# Patient Record
Sex: FEMALE | Race: WHITE | NOT HISPANIC OR LATINO
[De-identification: names, ages, dates, MRNs, and addresses within clinical notes are randomized per-mention and may not be internally consistent; named-entity substitution may affect disease eponyms.]

---

## 2017-01-09 ENCOUNTER — APPOINTMENT (OUTPATIENT)
Dept: OTOLARYNGOLOGY | Facility: CLINIC | Age: 47
End: 2017-01-09

## 2017-01-09 VITALS
TEMPERATURE: 98.6 F | HEIGHT: 69 IN | HEART RATE: 71 BPM | BODY MASS INDEX: 19.99 KG/M2 | DIASTOLIC BLOOD PRESSURE: 82 MMHG | SYSTOLIC BLOOD PRESSURE: 124 MMHG | WEIGHT: 135 LBS

## 2017-01-09 DIAGNOSIS — Z80.0 FAMILY HISTORY OF MALIGNANT NEOPLASM OF DIGESTIVE ORGANS: ICD-10-CM

## 2017-01-09 DIAGNOSIS — Z80.2 FAMILY HISTORY OF MALIGNANT NEOPLASM OF OTHER RESPIRATORY AND INTRATHORACIC ORGANS: ICD-10-CM

## 2017-02-06 ENCOUNTER — APPOINTMENT (OUTPATIENT)
Dept: OTOLARYNGOLOGY | Facility: CLINIC | Age: 47
End: 2017-02-06

## 2017-02-06 VITALS
DIASTOLIC BLOOD PRESSURE: 74 MMHG | HEART RATE: 82 BPM | WEIGHT: 135 LBS | BODY MASS INDEX: 19.99 KG/M2 | SYSTOLIC BLOOD PRESSURE: 108 MMHG | HEIGHT: 69 IN

## 2017-02-14 ENCOUNTER — OTHER (OUTPATIENT)
Age: 47
End: 2017-02-14

## 2017-02-16 ENCOUNTER — FORM ENCOUNTER (OUTPATIENT)
Age: 47
End: 2017-02-16

## 2017-02-17 ENCOUNTER — OUTPATIENT (OUTPATIENT)
Dept: OUTPATIENT SERVICES | Facility: HOSPITAL | Age: 47
LOS: 1 days | End: 2017-02-17
Payer: COMMERCIAL

## 2017-02-17 ENCOUNTER — APPOINTMENT (OUTPATIENT)
Dept: PULMONOLOGY | Facility: CLINIC | Age: 47
End: 2017-02-17

## 2017-02-17 DIAGNOSIS — G51.1 GENICULATE GANGLIONITIS: ICD-10-CM

## 2017-02-17 DIAGNOSIS — D16.4 BENIGN NEOPLASM OF BONES OF SKULL AND FACE: ICD-10-CM

## 2017-02-17 PROCEDURE — 71046 X-RAY EXAM CHEST 2 VIEWS: CPT

## 2017-02-17 PROCEDURE — 71020: CPT | Mod: 26

## 2017-02-24 ENCOUNTER — APPOINTMENT (OUTPATIENT)
Dept: OTOLARYNGOLOGY | Facility: CLINIC | Age: 47
End: 2017-02-24

## 2017-02-27 VITALS
TEMPERATURE: 98 F | HEART RATE: 97 BPM | OXYGEN SATURATION: 96 % | RESPIRATION RATE: 16 BRPM | DIASTOLIC BLOOD PRESSURE: 71 MMHG | WEIGHT: 133.16 LBS | SYSTOLIC BLOOD PRESSURE: 112 MMHG | HEIGHT: 69 IN

## 2017-02-27 NOTE — ASU PATIENT PROFILE, ADULT - PMH
Acoustic neuroma    Facial paralysis on left side    Hematoma  in abdomen s/p acoustic neuroma surgery  Hydrocephalus

## 2017-02-27 NOTE — ASU PATIENT PROFILE, ADULT - PSH
History of surgery  shunt for hydrocephalitis  History of surgery  removal of acoustic neuroma  History of surgery  implant of sling to left eye  History of surgery  nerve regeneration

## 2017-03-01 ENCOUNTER — INPATIENT (INPATIENT)
Facility: HOSPITAL | Age: 47
LOS: 1 days | Discharge: HOME | DRG: 41 | End: 2017-03-03
Attending: SPECIALIST | Admitting: SPECIALIST
Payer: COMMERCIAL

## 2017-03-01 ENCOUNTER — APPOINTMENT (OUTPATIENT)
Dept: OTOLARYNGOLOGY | Facility: HOSPITAL | Age: 47
End: 2017-03-01

## 2017-03-01 DIAGNOSIS — Z98.890 OTHER SPECIFIED POSTPROCEDURAL STATES: Chronic | ICD-10-CM

## 2017-03-01 DIAGNOSIS — G51.0 BELL'S PALSY: ICD-10-CM

## 2017-03-01 LAB — HCG SERPL-ACNC: <1 MIU/ML — SIGNIFICANT CHANGE UP

## 2017-03-01 PROCEDURE — 15275 SKIN SUB GRAFT FACE/NK/HF/G: CPT

## 2017-03-01 PROCEDURE — 15840 NERVE PALSY FASCIAL GRAFT: CPT | Mod: LT

## 2017-03-01 PROCEDURE — 30465 REPAIR NASAL STENOSIS: CPT

## 2017-03-01 RX ORDER — DEXAMETHASONE 0.5 MG/5ML
8 ELIXIR ORAL EVERY 8 HOURS
Qty: 0 | Refills: 0 | Status: DISCONTINUED | OUTPATIENT
Start: 2017-03-01 | End: 2017-03-03

## 2017-03-01 RX ORDER — ACETAMINOPHEN 500 MG
650 TABLET ORAL EVERY 6 HOURS
Qty: 0 | Refills: 0 | Status: DISCONTINUED | OUTPATIENT
Start: 2017-03-01 | End: 2017-03-03

## 2017-03-01 RX ORDER — PANTOPRAZOLE SODIUM 20 MG/1
40 TABLET, DELAYED RELEASE ORAL
Qty: 0 | Refills: 0 | Status: DISCONTINUED | OUTPATIENT
Start: 2017-03-01 | End: 2017-03-03

## 2017-03-01 RX ORDER — FOLIC ACID 0.8 MG
1 TABLET ORAL DAILY
Qty: 0 | Refills: 0 | Status: DISCONTINUED | OUTPATIENT
Start: 2017-03-01 | End: 2017-03-03

## 2017-03-01 RX ORDER — OXYCODONE HYDROCHLORIDE 5 MG/1
5 TABLET ORAL EVERY 4 HOURS
Qty: 0 | Refills: 0 | Status: DISCONTINUED | OUTPATIENT
Start: 2017-03-01 | End: 2017-03-03

## 2017-03-01 RX ORDER — ZOLPIDEM TARTRATE 10 MG/1
5 TABLET ORAL AT BEDTIME
Qty: 0 | Refills: 0 | Status: DISCONTINUED | OUTPATIENT
Start: 2017-03-01 | End: 2017-03-03

## 2017-03-01 RX ORDER — SODIUM CHLORIDE 9 MG/ML
1000 INJECTION, SOLUTION INTRAVENOUS
Qty: 0 | Refills: 0 | Status: DISCONTINUED | OUTPATIENT
Start: 2017-03-01 | End: 2017-03-02

## 2017-03-01 RX ORDER — MORPHINE SULFATE 50 MG/1
4 CAPSULE, EXTENDED RELEASE ORAL
Qty: 0 | Refills: 0 | Status: DISCONTINUED | OUTPATIENT
Start: 2017-03-01 | End: 2017-03-02

## 2017-03-01 RX ORDER — DIAZEPAM 5 MG
5 TABLET ORAL EVERY 8 HOURS
Qty: 0 | Refills: 0 | Status: DISCONTINUED | OUTPATIENT
Start: 2017-03-01 | End: 2017-03-03

## 2017-03-01 RX ORDER — CEFAZOLIN SODIUM 1 G
1000 VIAL (EA) INJECTION EVERY 8 HOURS
Qty: 0 | Refills: 0 | Status: DISCONTINUED | OUTPATIENT
Start: 2017-03-01 | End: 2017-03-03

## 2017-03-01 RX ORDER — MORPHINE SULFATE 50 MG/1
4 CAPSULE, EXTENDED RELEASE ORAL EVERY 6 HOURS
Qty: 0 | Refills: 0 | Status: DISCONTINUED | OUTPATIENT
Start: 2017-03-01 | End: 2017-03-02

## 2017-03-01 RX ORDER — THIAMINE MONONITRATE (VIT B1) 100 MG
100 TABLET ORAL DAILY
Qty: 0 | Refills: 0 | Status: DISCONTINUED | OUTPATIENT
Start: 2017-03-01 | End: 2017-03-03

## 2017-03-01 RX ORDER — PETROLATUM,WHITE
1 JELLY (GRAM) TOPICAL
Qty: 0 | Refills: 0 | Status: DISCONTINUED | OUTPATIENT
Start: 2017-03-01 | End: 2017-03-03

## 2017-03-01 RX ADMIN — MORPHINE SULFATE 4 MILLIGRAM(S): 50 CAPSULE, EXTENDED RELEASE ORAL at 14:43

## 2017-03-01 RX ADMIN — Medication 1 MILLIGRAM(S): at 22:10

## 2017-03-01 RX ADMIN — Medication 5 MILLIGRAM(S): at 22:14

## 2017-03-01 RX ADMIN — MORPHINE SULFATE 4 MILLIGRAM(S): 50 CAPSULE, EXTENDED RELEASE ORAL at 14:27

## 2017-03-01 RX ADMIN — Medication 101.6 MILLIGRAM(S): at 18:22

## 2017-03-01 RX ADMIN — SODIUM CHLORIDE 80 MILLILITER(S): 9 INJECTION, SOLUTION INTRAVENOUS at 18:23

## 2017-03-01 RX ADMIN — Medication 1 MILLIGRAM(S): at 17:55

## 2017-03-01 RX ADMIN — Medication 100 MILLIGRAM(S): at 18:14

## 2017-03-01 NOTE — H&P ADULT - PROBLEM SELECTOR PLAN 1
- regular room  - ancef  - face bra at all times  - decadron  - ambulate  - HOB 30 degrees  - replace fluffs as needed  - home ambien at bedtime PRN

## 2017-03-01 NOTE — H&P ADULT - HISTORY OF PRESENT ILLNESS
46F PMH AN and subsequent facial paralysis and left eyelid spring, cross face cable nerve graft and masseter transposition with nasal collapse, oral incompetence and continued facial paralysis admitted for surgical repair

## 2017-03-01 NOTE — H&P ADULT - ASSESSMENT
46F PMH AN and subsequent facial paralysis and left eyelid spring, cross face cable nerve graft and masseter transposition with nasal collapse, oral incompetence and continued facial paralysis admitted now s/p lip augmentation, suspension and lateralization of nasal ala

## 2017-03-01 NOTE — PROGRESS NOTE ADULT - SUBJECTIVE AND OBJECTIVE BOX
ENT [facility] POST OP CHECK    Procedure    No acute events post op. Pain controlled. Tolerating CLD. In face bra with multiple fluffs. Ambulating. Denies nausea/vomiting.       MEDICATIONS:  Antiinfectives:   ceFAZolin   IVPB 1000milliGRAM(s) IV Intermittent every 8 hours    IV fluids:  lactated ringers. 1000milliLiter(s) IV Continuous <Continuous>  thiamine 100milliGRAM(s) Oral daily  folic acid 1milliGRAM(s) Oral daily    Hematologic/Anticoagulation:    Pain medications/Neuro:  zolpidem 5milliGRAM(s) Oral at bedtime PRN  morphine  - Injectable 4milliGRAM(s) IV Push every 15 minutes PRN  oxyCODONE  5 mG/acetaminophen 325 mG 1Tablet(s) Oral every 4 hours PRN  oxyCODONE  5 mG/acetaminophen 325 mG 2Tablet(s) Oral every 4 hours PRN  morphine  - Injectable 4milliGRAM(s) IV Push every 6 hours PRN  acetaminophen   Tablet. 650milliGRAM(s) Oral every 6 hours PRN  diazepam    Tablet 5milliGRAM(s) Oral every 8 hours PRN    Endocrine Medications:   dexamethasone  IVPB 8milliGRAM(s) IV Intermittent every 8 hours    All other standing medications:   pantoprazole    Tablet 40milliGRAM(s) Oral before breakfast    All other PRN medications:      Vital Signs Last 24 Hrs  T(C): 36.6, Max: 36.6 (03-01 @ 13:40)  T(F): 97.8, Max: 97.8 (03-01 @ 13:40)  HR: 68 (68 - 104)  BP: 98/57 (84/54 - 114/66)  BP(mean): --  RR: 16 (16 - 16)  SpO2: 95% (95% - 100%)      I & Os for current day (as of 03-01 @ 18:14)  =============================================  IN:    lactated ringers.: 80 ml    Total IN: 80 ml  ---------------------------------------------  OUT:    Total OUT: 0 ml  ---------------------------------------------  Total NET: 80 ml        PHYSICAL EXAM:    ENT EXAM-   Constitutional: Well-developed, well-nourished.    OC/OP: Floor of mouth, buccal mucosa, lips, hard palate, soft palate, uvula, posterior pharyngeal wall normal.  Mucosa moist.  Neck:  Incision C/D/I, soft. penrose draining minimal sanguinous fluid. Minimally saturated dressing. appropriate TTP.   Neuro/Psych:  A&O x 3.  Mood stable.  feels shaky.  Pulm:  No dyspnea, non-labored breathing on room air      LABS:  CBC-      Coagulation Studies-    Endocrine Panel-              RADIOLOGY & ADDITIONAL STUDIES:      Assessment/Plan:  46y Female s/p lip augmentation, ala lateralization and suspension of lower face  - pain control  - anti-emetics prn  - soft diet  - ancef  - decadron  - change dressing as needed  - face bra at all times  - monitor for signs of withdrawl, Valium PRN if required  - home ambien at bedtime  - ambulate  - discussed case with Dr. Bowser who also examined the patient and agrees with the plan    PPX: SCDs, I/S    Dispo planning: likely no home needs

## 2017-03-01 NOTE — H&P ADULT - NSHPPHYSICALEXAM_GEN_ALL_CORE
NAD  non-labored breathing, no stridor  L facial paralysis, oral incompetence, left lip atrophy   EOMI, PERRL

## 2017-03-02 PROBLEM — G91.9 HYDROCEPHALUS, UNSPECIFIED: Chronic | Status: ACTIVE | Noted: 2017-02-27

## 2017-03-02 PROBLEM — G51.0 BELL'S PALSY: Chronic | Status: ACTIVE | Noted: 2017-02-27

## 2017-03-02 PROBLEM — T14.8 OTHER INJURY OF UNSPECIFIED BODY REGION: Chronic | Status: ACTIVE | Noted: 2017-02-27

## 2017-03-02 PROBLEM — D33.3 BENIGN NEOPLASM OF CRANIAL NERVES: Chronic | Status: ACTIVE | Noted: 2017-02-27

## 2017-03-02 RX ORDER — BENZOCAINE AND MENTHOL 5; 1 G/100ML; G/100ML
1 LIQUID ORAL EVERY 6 HOURS
Qty: 0 | Refills: 0 | Status: DISCONTINUED | OUTPATIENT
Start: 2017-03-02 | End: 2017-03-03

## 2017-03-02 RX ADMIN — OXYCODONE HYDROCHLORIDE 5 MILLIGRAM(S): 5 TABLET ORAL at 09:18

## 2017-03-02 RX ADMIN — Medication 101.6 MILLIGRAM(S): at 09:39

## 2017-03-02 RX ADMIN — OXYCODONE HYDROCHLORIDE 5 MILLIGRAM(S): 5 TABLET ORAL at 01:15

## 2017-03-02 RX ADMIN — Medication 1 MILLIGRAM(S): at 11:27

## 2017-03-02 RX ADMIN — OXYCODONE HYDROCHLORIDE 5 MILLIGRAM(S): 5 TABLET ORAL at 19:45

## 2017-03-02 RX ADMIN — Medication 100 MILLIGRAM(S): at 11:27

## 2017-03-02 RX ADMIN — ZOLPIDEM TARTRATE 5 MILLIGRAM(S): 10 TABLET ORAL at 03:02

## 2017-03-02 RX ADMIN — Medication 100 MILLIGRAM(S): at 17:35

## 2017-03-02 RX ADMIN — Medication 100 MILLIGRAM(S): at 09:39

## 2017-03-02 RX ADMIN — Medication 100 MILLIGRAM(S): at 04:14

## 2017-03-02 RX ADMIN — OXYCODONE HYDROCHLORIDE 5 MILLIGRAM(S): 5 TABLET ORAL at 00:32

## 2017-03-02 RX ADMIN — OXYCODONE HYDROCHLORIDE 5 MILLIGRAM(S): 5 TABLET ORAL at 19:18

## 2017-03-02 RX ADMIN — OXYCODONE HYDROCHLORIDE 5 MILLIGRAM(S): 5 TABLET ORAL at 15:02

## 2017-03-02 RX ADMIN — OXYCODONE HYDROCHLORIDE 5 MILLIGRAM(S): 5 TABLET ORAL at 09:48

## 2017-03-02 RX ADMIN — PANTOPRAZOLE SODIUM 40 MILLIGRAM(S): 20 TABLET, DELAYED RELEASE ORAL at 08:39

## 2017-03-02 RX ADMIN — Medication 101.6 MILLIGRAM(S): at 03:03

## 2017-03-02 RX ADMIN — OXYCODONE HYDROCHLORIDE 5 MILLIGRAM(S): 5 TABLET ORAL at 23:27

## 2017-03-02 RX ADMIN — Medication 101.6 MILLIGRAM(S): at 17:35

## 2017-03-02 RX ADMIN — BENZOCAINE AND MENTHOL 1 LOZENGE: 5; 1 LIQUID ORAL at 18:16

## 2017-03-02 RX ADMIN — OXYCODONE HYDROCHLORIDE 5 MILLIGRAM(S): 5 TABLET ORAL at 15:32

## 2017-03-02 NOTE — PROGRESS NOTE ADULT - SUBJECTIVE AND OBJECTIVE BOX
ENT [facility] DAILY PROGRESS NOTE    Overnight events/Interval HPI: 46y Female s/p lip augmentation, L nasal ala lateralization, lower lip suspension on 3/1/17. POD 1 c/o increased facial swelling overnight, facial dressing intact, pain controlled, tolerating diet, denies n/v/f/c.      Allergies  No Known Allergies      MEDICATIONS:  Antiinfectives:   ceFAZolin   IVPB 1000milliGRAM(s) IV Intermittent every 8 hours    IV fluids:  lactated ringers. 1000milliLiter(s) IV Continuous <Continuous>  thiamine 100milliGRAM(s) Oral daily  folic acid 1milliGRAM(s) Oral daily    Hematologic/Anticoagulation:    Pain medications/Neuro:  zolpidem 5milliGRAM(s) Oral at bedtime PRN  morphine  - Injectable 4milliGRAM(s) IV Push every 15 minutes PRN  morphine  - Injectable 4milliGRAM(s) IV Push every 6 hours PRN  acetaminophen   Tablet. 650milliGRAM(s) Oral every 6 hours PRN  diazepam    Tablet 5milliGRAM(s) Oral every 8 hours PRN  oxyCODONE Solution 5milliGRAM(s) Oral every 4 hours PRN    Endocrine Medications:   dexamethasone  IVPB 8milliGRAM(s) IV Intermittent every 8 hours    All other standing medications:   pantoprazole    Tablet 40milliGRAM(s) Oral before breakfast    All other PRN medications:  AQUAPHOR (petrolatum Ointment) 1Application(s) Topical two times a day PRN      Vital Signs Last 24 Hrs  T(C): 37, Max: 37 (03-02 @ 05:22)  T(F): 98.6, Max: 98.6 (03-02 @ 05:22)  HR: 100 (68 - 104)  BP: 100/65 (84/54 - 114/66)  BP(mean): --  RR: 18 (16 - 18)  SpO2: 95% (95% - 100%)      I & Os for current day (as of 03-02 @ 07:54)  =============================================  IN:    lactated ringers.: 1040 ml    Solution: 50 ml    Solution: 50 ml    Total IN: 1140 ml  ---------------------------------------------  OUT:    Voided: 2000 ml    Total OUT: 2000 ml  ---------------------------------------------  Total NET: -860 ml        PHYSICAL EXAM:    ENT EXAM-   Constitutional: Well-developed, well-nourished.    OC/OP: Floor of mouth, buccal mucosa, lips, hard palate, soft palate, uvula, posterior pharyngeal wall normal.  Mucosa moist.  Neck:  Incision C/D/I, soft. penrose draining minimal sanguinous fluid. Minimally saturated dressing. appropriate TTP.   Neuro/Psych:  A&O x 3.  Mood stable.  feels shaky.  Pulm:  No dyspnea, non-labored breathing on room air      Assessment/Plan:  46y Female s/p lip augmentation, ala lateralization and suspension of lower face  - pain control  - anti-emetics prn  - soft diet  - ancef  - decadron  - change dressing as needed  - face bra at all times  - monitor for signs of withdrawl, valium PRN if required  - home ambien at bedtime  - ambulate  - discussed case with Dr. Bowser who also examined the patient and agrees with the plan    PPX: SCDs, I/S    Dispo planning: no home care needs

## 2017-03-03 ENCOUNTER — TRANSCRIPTION ENCOUNTER (OUTPATIENT)
Age: 47
End: 2017-03-03

## 2017-03-03 VITALS
OXYGEN SATURATION: 97 % | SYSTOLIC BLOOD PRESSURE: 121 MMHG | TEMPERATURE: 98 F | DIASTOLIC BLOOD PRESSURE: 81 MMHG | RESPIRATION RATE: 17 BRPM | HEART RATE: 69 BPM

## 2017-03-03 PROCEDURE — 84702 CHORIONIC GONADOTROPIN TEST: CPT

## 2017-03-03 PROCEDURE — C1713: CPT

## 2017-03-03 RX ORDER — DOCUSATE SODIUM 100 MG
1 CAPSULE ORAL
Qty: 20 | Refills: 0 | OUTPATIENT
Start: 2017-03-03 | End: 2017-03-13

## 2017-03-03 RX ORDER — ACETAMINOPHEN 500 MG
2 TABLET ORAL
Qty: 0 | Refills: 0 | COMMUNITY
Start: 2017-03-03

## 2017-03-03 RX ORDER — LANOLIN/MINERAL OIL
1 LOTION (ML) TOPICAL
Qty: 1 | Refills: 0 | OUTPATIENT
Start: 2017-03-03

## 2017-03-03 RX ORDER — BENZOCAINE AND MENTHOL 5; 1 G/100ML; G/100ML
1 LIQUID ORAL
Qty: 1 | Refills: 0 | OUTPATIENT
Start: 2017-03-03

## 2017-03-03 RX ORDER — OXYCODONE HYDROCHLORIDE 5 MG/1
1 TABLET ORAL
Qty: 35 | Refills: 0 | OUTPATIENT
Start: 2017-03-03 | End: 2017-03-10

## 2017-03-03 RX ORDER — CEPHALEXIN 500 MG
1 CAPSULE ORAL
Qty: 28 | Refills: 0 | OUTPATIENT
Start: 2017-03-03 | End: 2017-03-10

## 2017-03-03 RX ORDER — SENNA PLUS 8.6 MG/1
2 TABLET ORAL
Qty: 20 | Refills: 0 | OUTPATIENT
Start: 2017-03-03 | End: 2017-03-13

## 2017-03-03 RX ORDER — FOLIC ACID 0.8 MG
1 TABLET ORAL
Qty: 10 | Refills: 0 | OUTPATIENT
Start: 2017-03-03 | End: 2017-03-13

## 2017-03-03 RX ORDER — THIAMINE MONONITRATE (VIT B1) 100 MG
1 TABLET ORAL
Qty: 10 | Refills: 0 | OUTPATIENT
Start: 2017-03-03 | End: 2017-03-13

## 2017-03-03 RX ADMIN — OXYCODONE HYDROCHLORIDE 5 MILLIGRAM(S): 5 TABLET ORAL at 00:20

## 2017-03-03 RX ADMIN — Medication 101.6 MILLIGRAM(S): at 11:07

## 2017-03-03 RX ADMIN — Medication 100 MILLIGRAM(S): at 11:08

## 2017-03-03 RX ADMIN — Medication 101.6 MILLIGRAM(S): at 01:34

## 2017-03-03 RX ADMIN — Medication 100 MILLIGRAM(S): at 01:34

## 2017-03-03 RX ADMIN — ZOLPIDEM TARTRATE 5 MILLIGRAM(S): 10 TABLET ORAL at 01:33

## 2017-03-03 RX ADMIN — OXYCODONE HYDROCHLORIDE 5 MILLIGRAM(S): 5 TABLET ORAL at 11:08

## 2017-03-03 RX ADMIN — PANTOPRAZOLE SODIUM 40 MILLIGRAM(S): 20 TABLET, DELAYED RELEASE ORAL at 05:46

## 2017-03-03 RX ADMIN — OXYCODONE HYDROCHLORIDE 5 MILLIGRAM(S): 5 TABLET ORAL at 05:46

## 2017-03-03 RX ADMIN — Medication 1 MILLIGRAM(S): at 11:08

## 2017-03-03 RX ADMIN — OXYCODONE HYDROCHLORIDE 5 MILLIGRAM(S): 5 TABLET ORAL at 12:54

## 2017-03-03 NOTE — DISCHARGE NOTE ADULT - INSTRUCTIONS
1. Report any fever, chills, difficulty breathing, difficulty swallowing, bleeding or purulent drainage from your incision sites, or any significant swelling to your face/neck to the doctor immediately.  2. Report any change in the movements of your face to the doctor immediately.    3. Diet: soft/mechanical soft diet, no sharp foods, no extensive chewing of steaks or hard meats ect. You can resume a normal diet once instructed by your doctor  4. Activity: no heavy lifting, do not lift anything heavier than a gallon of milk until after you follow up appointment with your doctor, no strenuous activity such as running or biking.  5. Shower/Bathing: you may shower starting 48 hrs after your procedure, but do not scrub at any of your incision sites.  6. Wound care: keep your incision site clean and dry, if you have any drainage or crusting on your incisions you may gently clean them with 1/2 sterile saline 1/2 hydrogen peroxide, do NOT scrub or pick off any crusting it should be gently cleaned and will come off on its own once softened by 1/2 saline 1/2 peroxide solution.   7. Take all medications as prescribed.   8. Continue all of your normal home medications unless told otherwise.  9. Avoid any NSAIDS or ibuprofen/asa containing products you may take Tylenol for mild pain.  10. Keep face bra in place and wear 24 hrs a day except for when showering until you see Dr. Bowser in clinic on Monday, he will tell you if you should continue wearing it longer on Monday. Face bra can be washed in warm soapy water and air dried if you need to wash it for any reason.

## 2017-03-03 NOTE — DISCHARGE NOTE ADULT - HOSPITAL COURSE
46y Female s/p lip augmentation, L nasal ala lateralization, lower lip suspension on 3/1/17.   POD 1: c/o increased facial swelling overnight, facial dressing intact, pain controlled, tolerating diet, denies n/v/f/c. kept overnight for pain control  POD 2:  pain controlled on oral regimen, stable for d/c home      Discharge exam:   ENT EXAM-   Constitutional: Well-developed, well-nourished.    OC/OP: Floor of mouth, buccal mucosa, lips, hard palate, soft palate, uvula, posterior pharyngeal wall normal.  Mucosa moist.  Face/Neck:  Incision C/D/I, soft. ecchymotic swelling improved from yesterday, face bra in place. appropriate TTP.   Neuro/Psych:  A&O x 3.  Mood stable.   Pulm:  No dyspnea, non-labored breathing on room air    Vital Signs Last 24 Hrs  T(C): 36.6, Max: 37.3 (03-03 @ 05:53)  T(F): 97.8, Max: 99.2 (03-03 @ 05:53)  HR: 69 (66 - 86)  BP: 121/81 (99/65 - 121/81)  BP(mean): --  RR: 17 (16 - 17)  SpO2: 97% (96% - 98%)

## 2017-03-03 NOTE — DISCHARGE NOTE ADULT - PATIENT PORTAL LINK FT
“You can access the [employer] Patient Portal, offered by [facility], by registering with the following website: [email or web address]”

## 2017-03-03 NOTE — DISCHARGE NOTE ADULT - MEDICATION SUMMARY - MEDICATIONS TO TAKE
I will START or STAY ON the medications listed below when I get home from the hospital:    Medrol Dosepak 4 mg oral tablet  -- Take medrol dose pack as instructed on package.  -- It is very important that you take or use this exactly as directed.  Do not skip doses or discontinue unless directed by your doctor.  Obtain medical advice before taking any non-prescription drugs as some may affect the action of this medication.  Take with food or milk.    -- Indication: For To help with post op swelling    Percocet 5/325 oral tablet  -- 1 tab(s) by mouth every 4 hours, As Needed -for severe pain MDD:6 tabs. Do not take at the same time as regular Tyenol.  -- Caution federal law prohibits the transfer of this drug to any person other  than the person for whom it was prescribed.  May cause drowsiness.  Alcohol may intensify this effect.  Use care when operating dangerous machinery.  This prescription cannot be refilled.  This product contains acetaminophen.  Do not use  with any other product containing acetaminophen to prevent possible liver damage.  Using more of this medication than prescribed may cause serious breathing problems.    -- Indication: For As needed for post op pain    acetaminophen 325 mg oral tablet  -- 2 tab(s) by mouth every 6 hours, As needed, Mild Pain, fever>100.4, headache  -- Indication: For As needed for mild pain     cephalexin 500 mg oral capsule  -- 1 cap(s) by mouth 4 times a day x 7 days  -- Finish all this medication unless otherwise directed by prescriber.    -- Indication: For To prevent post op infection    Aquaphor topical ointment  -- Apply on skin to lips 2 times a day PRN dry lips  -- For external use only.    -- Indication: For For dry lips    benzocaine-menthol 15 mg-3.6 mg mucous membrane lozenge  -- 1 lozenge mucous membrane 4 times a day, As Needed for sore throat  -- Indication: For As needed for sore throat    folic acid 1 mg oral tablet  -- 1 tab(s) by mouth once a day  -- Indication: For Vitamins to take post op     thiamine 100 mg oral tablet  -- 1 tab(s) by mouth once a day  -- Indication: For Vitamins to take post op I will START or STAY ON the medications listed below when I get home from the hospital:    Medrol Dosepak 4 mg oral tablet  -- Take medrol dose pack as instructed on package.  -- It is very important that you take or use this exactly as directed.  Do not skip doses or discontinue unless directed by your doctor.  Obtain medical advice before taking any non-prescription drugs as some may affect the action of this medication.  Take with food or milk.    -- Indication: For To help with post op swelling    Percocet 5/325 oral tablet  -- 1 tab(s) by mouth every 4 hours, As Needed -for severe pain MDD:6 tabs. Do not take at the same time as regular Tyenol.  -- Caution federal law prohibits the transfer of this drug to any person other  than the person for whom it was prescribed.  May cause drowsiness.  Alcohol may intensify this effect.  Use care when operating dangerous machinery.  This prescription cannot be refilled.  This product contains acetaminophen.  Do not use  with any other product containing acetaminophen to prevent possible liver damage.  Using more of this medication than prescribed may cause serious breathing problems.    -- Indication: For As needed for post op pain    acetaminophen 325 mg oral tablet  -- 2 tab(s) by mouth every 6 hours, As needed, Mild Pain, fever>100.4, headache  -- Indication: For As needed for mild pain     cephalexin 500 mg oral capsule  -- 1 cap(s) by mouth 4 times a day x 7 days  -- Finish all this medication unless otherwise directed by prescriber.    -- Indication: For To prevent post op infection    Aquaphor topical ointment  -- Apply on skin to lips 2 times a day PRN dry lips  -- For external use only.    -- Indication: For For dry lips    senna 8.6 mg oral tablet  -- 2 tab(s) by mouth once a day (at bedtime), As Needed -for constipation  -- Indication: For As needed for constipation    Colace sodium 100 mg oral capsule  -- 1 cap(s) by mouth 2 times a day, As Needed -for constipation  -- Medication should be taken with plenty of water.    -- Indication: For As needed for constipation    benzocaine-menthol 15 mg-3.6 mg mucous membrane lozenge  -- 1 lozenge mucous membrane 4 times a day, As Needed for sore throat  -- Indication: For As needed for sore throat    folic acid 1 mg oral tablet  -- 1 tab(s) by mouth once a day  -- Indication: For Vitamins to take post op     thiamine 100 mg oral tablet  -- 1 tab(s) by mouth once a day  -- Indication: For Vitamins to take post op I will START or STAY ON the medications listed below when I get home from the hospital:    Medrol Dosepak 4 mg oral tablet  -- Take medrol dose pack as instructed on package.  -- It is very important that you take or use this exactly as directed.  Do not skip doses or discontinue unless directed by your doctor.  Obtain medical advice before taking any non-prescription drugs as some may affect the action of this medication.  Take with food or milk.    -- Indication: For To help with post op swelling    oxyCODONE 5 mg oral tablet  -- 1 tab(s) by mouth every 4-6 hours MDD:5-6 tabs PRN severe pain  -- Caution federal law prohibits the transfer of this drug to any person other  than the person for whom it was prescribed.  It is very important that you take or use this exactly as directed.  Do not skip doses or discontinue unless directed by your doctor.  May cause drowsiness.  Alcohol may intensify this effect.  Use care when operating dangerous machinery.  This prescription cannot be refilled.  Using more of this medication than prescribed may cause serious breathing problems.    -- Indication: For As needed for severe pain    acetaminophen 325 mg oral tablet  -- 2 tab(s) by mouth every 6 hours, As needed, Mild Pain, fever>100.4, headache  -- Indication: For As needed for mild pain     cephalexin 500 mg oral capsule  -- 1 cap(s) by mouth 4 times a day x 7 days  -- Finish all this medication unless otherwise directed by prescriber.    -- Indication: For To prevent post op infection    Aquaphor topical ointment  -- Apply on skin to lips 2 times a day PRN dry lips  -- For external use only.    -- Indication: For For dry lips    senna 8.6 mg oral tablet  -- 2 tab(s) by mouth once a day (at bedtime), As Needed -for constipation  -- Indication: For As needed for constipation    Colace sodium 100 mg oral capsule  -- 1 cap(s) by mouth 2 times a day, As Needed -for constipation  -- Medication should be taken with plenty of water.    -- Indication: For As needed for constipation    benzocaine-menthol 15 mg-3.6 mg mucous membrane lozenge  -- 1 lozenge mucous membrane 4 times a day, As Needed for sore throat  -- Indication: For As needed for sore throat    folic acid 1 mg oral tablet  -- 1 tab(s) by mouth once a day  -- Indication: For Vitamins to take post op     thiamine 100 mg oral tablet  -- 1 tab(s) by mouth once a day  -- Indication: For Vitamins to take post op

## 2017-03-03 NOTE — DISCHARGE NOTE ADULT - CARE PROVIDERS DIRECT ADDRESSES
[email or web address]

## 2017-03-03 NOTE — DISCHARGE NOTE ADULT - CARE PROVIDER_API CALL
Edward Bowser), Otolaryngology  [address] [place] [address]  Phone: [phone]  Fax: [phone]

## 2017-03-03 NOTE — DISCHARGE NOTE ADULT - CARE PLAN
Principal Discharge DX:	Facial paralysis on left side  Goal:	s/p lip augmentation, L nasal ala lateralization, lower lip suspension  Instructions for follow-up, activity and diet:	Follow up with Dr. Bowser on Monday 3/6/17. Call his office to make your follow up appointment.

## 2017-03-03 NOTE — DISCHARGE NOTE ADULT - PLAN OF CARE
s/p lip augmentation, L nasal ala lateralization, lower lip suspension Follow up with Dr. Bowser on Monday 3/6/17. Call his office to make your follow up appointment.

## 2017-03-06 ENCOUNTER — APPOINTMENT (OUTPATIENT)
Dept: OTOLARYNGOLOGY | Facility: CLINIC | Age: 47
End: 2017-03-06

## 2017-03-06 VITALS
HEART RATE: 98 BPM | HEIGHT: 69 IN | DIASTOLIC BLOOD PRESSURE: 71 MMHG | SYSTOLIC BLOOD PRESSURE: 101 MMHG | WEIGHT: 135 LBS | BODY MASS INDEX: 19.99 KG/M2

## 2017-03-06 DIAGNOSIS — G51.0 BELL'S PALSY: ICD-10-CM
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
## 2017-03-06 DIAGNOSIS — J34.89 OTHER SPECIFIED DISORDERS OF NOSE AND NASAL SINUSES: ICD-10-CM

## 2017-03-06 DIAGNOSIS — G97.82 OTHER POSTPROCEDURAL COMPLICATIONS AND DISORDERS OF NERVOUS SYSTEM: ICD-10-CM
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
## 2017-03-06 DIAGNOSIS — M96.89 OTHER INTRAOPERATIVE AND POSTPROCEDURAL COMPLICATIONS AND DISORDERS OF THE MUSCULOSKELETAL SYSTEM: ICD-10-CM

## 2017-03-06 RX ORDER — ZOLPIDEM TARTRATE 5 MG/1
5 TABLET ORAL
Qty: 14 | Refills: 0 | Status: COMPLETED | COMMUNITY
Start: 2017-03-06 | End: 2017-03-06

## 2017-03-07 DIAGNOSIS — M62.89 OTHER SPECIFIED DISORDERS OF MUSCLE: ICD-10-CM

## 2017-03-08 DIAGNOSIS — G91.9 HYDROCEPHALUS, UNSPECIFIED: ICD-10-CM

## 2017-03-08 DIAGNOSIS — Z98.2 PRESENCE OF CEREBROSPINAL FLUID DRAINAGE DEVICE: ICD-10-CM

## 2017-03-09 ENCOUNTER — APPOINTMENT (OUTPATIENT)
Dept: OTOLARYNGOLOGY | Facility: CLINIC | Age: 47
End: 2017-03-09

## 2017-03-14 ENCOUNTER — OTHER (OUTPATIENT)
Age: 47
End: 2017-03-14

## 2017-03-14 DIAGNOSIS — G89.18 OTHER ACUTE POSTPROCEDURAL PAIN: ICD-10-CM

## 2017-03-14 DIAGNOSIS — G47.00 INSOMNIA, UNSPECIFIED: ICD-10-CM

## 2017-03-14 RX ORDER — ZOLPIDEM TARTRATE 5 MG/1
5 TABLET ORAL
Qty: 10 | Refills: 0 | Status: ACTIVE | COMMUNITY
Start: 2017-03-14 | End: 1900-01-01

## 2017-03-27 ENCOUNTER — APPOINTMENT (OUTPATIENT)
Dept: OTOLARYNGOLOGY | Facility: CLINIC | Age: 47
End: 2017-03-27

## 2017-04-03 ENCOUNTER — APPOINTMENT (OUTPATIENT)
Dept: OTOLARYNGOLOGY | Facility: CLINIC | Age: 47
End: 2017-04-03

## 2017-04-03 ENCOUNTER — EMERGENCY (EMERGENCY)
Facility: HOSPITAL | Age: 47
LOS: 1 days | Discharge: HOME | End: 2017-04-03
Attending: EMERGENCY MEDICINE | Admitting: EMERGENCY MEDICINE
Payer: COMMERCIAL

## 2017-04-03 VITALS
HEART RATE: 100 BPM | DIASTOLIC BLOOD PRESSURE: 91 MMHG | TEMPERATURE: 98 F | SYSTOLIC BLOOD PRESSURE: 151 MMHG | OXYGEN SATURATION: 97 % | RESPIRATION RATE: 18 BRPM

## 2017-04-03 DIAGNOSIS — R55 SYNCOPE AND COLLAPSE: ICD-10-CM

## 2017-04-03 DIAGNOSIS — Z98.890 OTHER SPECIFIED POSTPROCEDURAL STATES: Chronic | ICD-10-CM

## 2017-04-03 DIAGNOSIS — Z79.891 LONG TERM (CURRENT) USE OF OPIATE ANALGESIC: ICD-10-CM

## 2017-04-03 DIAGNOSIS — Z79.899 OTHER LONG TERM (CURRENT) DRUG THERAPY: ICD-10-CM

## 2017-04-03 LAB
ALBUMIN SERPL ELPH-MCNC: 4.3 G/DL — SIGNIFICANT CHANGE UP (ref 3.4–5)
ALP SERPL-CCNC: 58 U/L — SIGNIFICANT CHANGE UP (ref 40–120)
ALT FLD-CCNC: 47 U/L — HIGH (ref 12–42)
ANION GAP SERPL CALC-SCNC: 12 MMOL/L — SIGNIFICANT CHANGE UP (ref 9–16)
APPEARANCE UR: CLEAR — SIGNIFICANT CHANGE UP
AST SERPL-CCNC: 73 U/L — HIGH (ref 15–37)
BASOPHILS NFR BLD AUTO: 0.5 % — SIGNIFICANT CHANGE UP (ref 0–2)
BILIRUB SERPL-MCNC: 0.4 MG/DL — SIGNIFICANT CHANGE UP (ref 0.2–1.2)
BILIRUB UR-MCNC: NEGATIVE — SIGNIFICANT CHANGE UP
BUN SERPL-MCNC: 4 MG/DL — LOW (ref 7–23)
CALCIUM SERPL-MCNC: 8.9 MG/DL — SIGNIFICANT CHANGE UP (ref 8.5–10.5)
CHLORIDE SERPL-SCNC: 102 MMOL/L — SIGNIFICANT CHANGE UP (ref 96–108)
CO2 SERPL-SCNC: 27 MMOL/L — SIGNIFICANT CHANGE UP (ref 22–31)
COLOR SPEC: YELLOW — SIGNIFICANT CHANGE UP
CREAT SERPL-MCNC: 0.58 MG/DL — SIGNIFICANT CHANGE UP (ref 0.5–1.3)
D DIMER BLD IA.RAPID-MCNC: 413 NG/ML DDU — HIGH
DIFF PNL FLD: (no result)
EOSINOPHIL NFR BLD AUTO: 0.5 % — SIGNIFICANT CHANGE UP (ref 0–6)
GLUCOSE SERPL-MCNC: 93 MG/DL — SIGNIFICANT CHANGE UP (ref 70–99)
GLUCOSE UR QL: NEGATIVE — SIGNIFICANT CHANGE UP
HCT VFR BLD CALC: 37 % — SIGNIFICANT CHANGE UP (ref 34.5–45)
HGB BLD-MCNC: 12.6 G/DL — SIGNIFICANT CHANGE UP (ref 11.5–15.5)
KETONES UR-MCNC: NEGATIVE — SIGNIFICANT CHANGE UP
LEUKOCYTE ESTERASE UR-ACNC: NEGATIVE — SIGNIFICANT CHANGE UP
LYMPHOCYTES # BLD AUTO: 13.6 % — SIGNIFICANT CHANGE UP (ref 13–44)
MCHC RBC-ENTMCNC: 34.1 G/DL — SIGNIFICANT CHANGE UP (ref 32–36)
MCHC RBC-ENTMCNC: 34.1 PG — HIGH (ref 27–34)
MCV RBC AUTO: 100 FL — SIGNIFICANT CHANGE UP (ref 80–100)
MONOCYTES NFR BLD AUTO: 14.1 % — HIGH (ref 2–14)
NEUTROPHILS NFR BLD AUTO: 71.3 % — SIGNIFICANT CHANGE UP (ref 43–77)
NITRITE UR-MCNC: NEGATIVE — SIGNIFICANT CHANGE UP
PH UR: 7.5 — SIGNIFICANT CHANGE UP (ref 4–8)
PLATELET # BLD AUTO: 214 K/UL — SIGNIFICANT CHANGE UP (ref 150–400)
POTASSIUM SERPL-MCNC: 3.4 MMOL/L — LOW (ref 3.5–5.3)
POTASSIUM SERPL-SCNC: 3.4 MMOL/L — LOW (ref 3.5–5.3)
PROT SERPL-MCNC: 8 G/DL — SIGNIFICANT CHANGE UP (ref 6.4–8.2)
PROT UR-MCNC: NEGATIVE MG/DL — SIGNIFICANT CHANGE UP
RBC # BLD: 3.7 M/UL — LOW (ref 3.8–5.2)
RBC # FLD: 11.5 % — SIGNIFICANT CHANGE UP (ref 10.3–16.9)
SODIUM SERPL-SCNC: 141 MMOL/L — SIGNIFICANT CHANGE UP (ref 135–145)
SP GR SPEC: 1.01 — SIGNIFICANT CHANGE UP (ref 1–1.03)
UROBILINOGEN FLD QL: 0.2 E.U./DL — SIGNIFICANT CHANGE UP
WBC # BLD: 3.8 K/UL — SIGNIFICANT CHANGE UP (ref 3.8–10.5)
WBC # FLD AUTO: 3.8 K/UL — SIGNIFICANT CHANGE UP (ref 3.8–10.5)

## 2017-04-03 PROCEDURE — 71275 CT ANGIOGRAPHY CHEST: CPT | Mod: 26

## 2017-04-03 PROCEDURE — 99284 EMERGENCY DEPT VISIT MOD MDM: CPT | Mod: 25

## 2017-04-03 PROCEDURE — 70450 CT HEAD/BRAIN W/O DYE: CPT | Mod: 26

## 2017-04-03 PROCEDURE — 93010 ELECTROCARDIOGRAM REPORT: CPT

## 2017-04-03 RX ORDER — SODIUM CHLORIDE 9 MG/ML
1000 INJECTION INTRAMUSCULAR; INTRAVENOUS; SUBCUTANEOUS ONCE
Qty: 0 | Refills: 0 | Status: COMPLETED | OUTPATIENT
Start: 2017-04-03 | End: 2017-04-03

## 2017-04-03 RX ADMIN — SODIUM CHLORIDE 1000 MILLILITER(S): 9 INJECTION INTRAMUSCULAR; INTRAVENOUS; SUBCUTANEOUS at 23:32

## 2017-04-03 RX ADMIN — SODIUM CHLORIDE 1000 MILLILITER(S): 9 INJECTION INTRAMUSCULAR; INTRAVENOUS; SUBCUTANEOUS at 21:31

## 2017-04-03 NOTE — ED PROVIDER NOTE - OBJECTIVE STATEMENT
45 y/o f hx acoustic neuroma s/p facial reconstructive surgery 1 month ago presents reporting syncopal episode earlier this morning.  Pt stating she hadn't eaten anything, was standing in court and felt lightheaded, pt stating she went down to the ground and lost consciousness, but doesn't think she hit her head.  Pt was taken to [facility] ED and subsequently discharged after normal EKG and bloodwork.  Pt stating this evening still feeling lightheaded, although reportedly still hasn't eaten anything today.  Pt denies CP, SOB, fever, cough, abd pain, n/v, all other ROS negative.

## 2017-04-03 NOTE — ED ADULT TRIAGE NOTE - CHIEF COMPLAINT QUOTE
syncope this morning at court office witnessed denies any head injury went to [facility] ED and fell before she came here unable to remember if she pass out.

## 2017-04-03 NOTE — ED PROVIDER NOTE - ATTENDING CONTRIBUTION TO CARE
46F with hx of acoustic neuroma, L sided facial paralysis. Pt was standing up next to her  in court, had not eaten anything, had syncope. Went to [facility] for evaluation, was discharged. Pt states she still feels dizzy. Pt has normal exam including cardiac/neuro. Nl EKG. Labs and d-dimer positive, head CT scan neg. CTA chest to r/o PE. if CT PE neg, will dc home. Will give IV fluids.

## 2017-04-03 NOTE — ED ADULT NURSE NOTE - CHIEF COMPLAINT QUOTE
syncope this morning at court office witnessed denies any head injury went to [facility] ED and fell before she came here unable to remember if she pass out.

## 2017-04-03 NOTE — ED PROVIDER NOTE - MEDICAL DECISION MAKING DETAILS
47 y/o f hx acoustic neuroma s/p facial reconstructive surgery 1 month ago presents c/o dizziness and syncopal episode this morning; pt still feeling lightheaded, already evaluated and d/c from [facility] today.  EKG normal sinus, CT head negative, d-dimer elevated although CTA chest negative for PE.  Pt stable for d/c, will f/u with pmd.

## 2017-04-04 ENCOUNTER — INPATIENT (INPATIENT)
Facility: HOSPITAL | Age: 47
LOS: 0 days | Discharge: HOME | DRG: 392 | End: 2017-04-05
Attending: INTERNAL MEDICINE | Admitting: INTERNAL MEDICINE
Payer: COMMERCIAL

## 2017-04-04 VITALS
TEMPERATURE: 98 F | SYSTOLIC BLOOD PRESSURE: 154 MMHG | RESPIRATION RATE: 18 BRPM | DIASTOLIC BLOOD PRESSURE: 96 MMHG | OXYGEN SATURATION: 96 % | HEART RATE: 88 BPM

## 2017-04-04 VITALS
OXYGEN SATURATION: 97 % | RESPIRATION RATE: 18 BRPM | HEIGHT: 69 IN | SYSTOLIC BLOOD PRESSURE: 153 MMHG | DIASTOLIC BLOOD PRESSURE: 94 MMHG | HEART RATE: 76 BPM | TEMPERATURE: 97 F | WEIGHT: 138.01 LBS

## 2017-04-04 DIAGNOSIS — Z98.890 OTHER SPECIFIED POSTPROCEDURAL STATES: Chronic | ICD-10-CM

## 2017-04-04 DIAGNOSIS — R11.2 NAUSEA WITH VOMITING, UNSPECIFIED: ICD-10-CM

## 2017-04-04 DIAGNOSIS — R63.8 OTHER SYMPTOMS AND SIGNS CONCERNING FOOD AND FLUID INTAKE: ICD-10-CM

## 2017-04-04 DIAGNOSIS — Z41.8 ENCOUNTER FOR OTHER PROCEDURES FOR PURPOSES OTHER THAN REMEDYING HEALTH STATE: ICD-10-CM

## 2017-04-04 DIAGNOSIS — D33.3 BENIGN NEOPLASM OF CRANIAL NERVES: ICD-10-CM

## 2017-04-04 LAB
ALBUMIN SERPL ELPH-MCNC: 3.9 G/DL — SIGNIFICANT CHANGE UP (ref 3.4–5)
ALP SERPL-CCNC: 50 U/L — SIGNIFICANT CHANGE UP (ref 40–120)
ALT FLD-CCNC: 44 U/L — HIGH (ref 12–42)
ANION GAP SERPL CALC-SCNC: 13 MMOL/L — SIGNIFICANT CHANGE UP (ref 9–16)
AST SERPL-CCNC: 47 U/L — HIGH (ref 15–37)
BASOPHILS NFR BLD AUTO: 0.2 % — SIGNIFICANT CHANGE UP (ref 0–2)
BILIRUB SERPL-MCNC: 0.9 MG/DL — SIGNIFICANT CHANGE UP (ref 0.2–1.2)
BUN SERPL-MCNC: 3 MG/DL — LOW (ref 7–23)
CALCIUM SERPL-MCNC: 8.3 MG/DL — LOW (ref 8.5–10.5)
CHLORIDE SERPL-SCNC: 100 MMOL/L — SIGNIFICANT CHANGE UP (ref 96–108)
CO2 SERPL-SCNC: 22 MMOL/L — SIGNIFICANT CHANGE UP (ref 22–31)
CREAT SERPL-MCNC: 0.52 MG/DL — SIGNIFICANT CHANGE UP (ref 0.5–1.3)
EOSINOPHIL NFR BLD AUTO: 0.6 % — SIGNIFICANT CHANGE UP (ref 0–6)
ETHANOL SERPL-MCNC: <3 MG/DL — SIGNIFICANT CHANGE UP
GLUCOSE SERPL-MCNC: 126 MG/DL — HIGH (ref 70–99)
HCT VFR BLD CALC: 35.5 % — SIGNIFICANT CHANGE UP (ref 34.5–45)
HGB BLD-MCNC: 12 G/DL — SIGNIFICANT CHANGE UP (ref 11.5–15.5)
LIDOCAIN IGE QN: 239 U/L — SIGNIFICANT CHANGE UP (ref 73–393)
LYMPHOCYTES # BLD AUTO: 8.9 % — LOW (ref 13–44)
MCHC RBC-ENTMCNC: 33.7 PG — SIGNIFICANT CHANGE UP (ref 27–34)
MCHC RBC-ENTMCNC: 33.8 G/DL — SIGNIFICANT CHANGE UP (ref 32–36)
MCV RBC AUTO: 99.7 FL — SIGNIFICANT CHANGE UP (ref 80–100)
MONOCYTES NFR BLD AUTO: 16 % — HIGH (ref 2–14)
NEUTROPHILS NFR BLD AUTO: 74.3 % — SIGNIFICANT CHANGE UP (ref 43–77)
PHOSPHATE SERPL-MCNC: 2.9 MG/DL — SIGNIFICANT CHANGE UP (ref 2.5–4.5)
PLATELET # BLD AUTO: 195 K/UL — SIGNIFICANT CHANGE UP (ref 150–400)
POTASSIUM SERPL-MCNC: 3.3 MMOL/L — LOW (ref 3.5–5.3)
POTASSIUM SERPL-SCNC: 3.3 MMOL/L — LOW (ref 3.5–5.3)
PROT SERPL-MCNC: 7.2 G/DL — SIGNIFICANT CHANGE UP (ref 6.4–8.2)
RBC # BLD: 3.56 M/UL — LOW (ref 3.8–5.2)
RBC # FLD: 12 % — SIGNIFICANT CHANGE UP (ref 10.3–16.9)
SODIUM SERPL-SCNC: 135 MMOL/L — SIGNIFICANT CHANGE UP (ref 135–145)
WBC # BLD: 4.6 K/UL — SIGNIFICANT CHANGE UP (ref 3.8–10.5)
WBC # FLD AUTO: 4.6 K/UL — SIGNIFICANT CHANGE UP (ref 3.8–10.5)

## 2017-04-04 PROCEDURE — 36415 COLL VENOUS BLD VENIPUNCTURE: CPT

## 2017-04-04 PROCEDURE — 71275 CT ANGIOGRAPHY CHEST: CPT

## 2017-04-04 PROCEDURE — 85025 COMPLETE CBC W/AUTO DIFF WBC: CPT

## 2017-04-04 PROCEDURE — 96374 THER/PROPH/DIAG INJ IV PUSH: CPT

## 2017-04-04 PROCEDURE — 99223 1ST HOSP IP/OBS HIGH 75: CPT | Mod: GC

## 2017-04-04 PROCEDURE — 93005 ELECTROCARDIOGRAM TRACING: CPT

## 2017-04-04 PROCEDURE — 81003 URINALYSIS AUTO W/O SCOPE: CPT

## 2017-04-04 PROCEDURE — 85379 FIBRIN DEGRADATION QUANT: CPT

## 2017-04-04 PROCEDURE — 99284 EMERGENCY DEPT VISIT MOD MDM: CPT | Mod: 25

## 2017-04-04 PROCEDURE — 99285 EMERGENCY DEPT VISIT HI MDM: CPT

## 2017-04-04 PROCEDURE — 74020: CPT | Mod: 26

## 2017-04-04 PROCEDURE — 70450 CT HEAD/BRAIN W/O DYE: CPT

## 2017-04-04 PROCEDURE — 80053 COMPREHEN METABOLIC PANEL: CPT

## 2017-04-04 PROCEDURE — 81001 URINALYSIS AUTO W/SCOPE: CPT

## 2017-04-04 RX ORDER — OXYCODONE HYDROCHLORIDE 5 MG/1
5 TABLET ORAL EVERY 6 HOURS
Qty: 0 | Refills: 0 | Status: DISCONTINUED | OUTPATIENT
Start: 2017-04-04 | End: 2017-04-05

## 2017-04-04 RX ORDER — ONDANSETRON 8 MG/1
4 TABLET, FILM COATED ORAL EVERY 6 HOURS
Qty: 0 | Refills: 0 | Status: DISCONTINUED | OUTPATIENT
Start: 2017-04-04 | End: 2017-04-05

## 2017-04-04 RX ORDER — ONDANSETRON 8 MG/1
4 TABLET, FILM COATED ORAL ONCE
Qty: 0 | Refills: 0 | Status: COMPLETED | OUTPATIENT
Start: 2017-04-04 | End: 2017-04-04

## 2017-04-04 RX ORDER — SODIUM CHLORIDE 9 MG/ML
1000 INJECTION INTRAMUSCULAR; INTRAVENOUS; SUBCUTANEOUS ONCE
Qty: 0 | Refills: 0 | Status: COMPLETED | OUTPATIENT
Start: 2017-04-04 | End: 2017-04-04

## 2017-04-04 RX ORDER — METOCLOPRAMIDE HCL 10 MG
10 TABLET ORAL ONCE
Qty: 0 | Refills: 0 | Status: COMPLETED | OUTPATIENT
Start: 2017-04-04 | End: 2017-04-04

## 2017-04-04 RX ORDER — HEPARIN SODIUM 5000 [USP'U]/ML
5000 INJECTION INTRAVENOUS; SUBCUTANEOUS EVERY 8 HOURS
Qty: 0 | Refills: 0 | Status: DISCONTINUED | OUTPATIENT
Start: 2017-04-04 | End: 2017-04-05

## 2017-04-04 RX ORDER — FOLIC ACID 0.8 MG
1 TABLET ORAL DAILY
Qty: 0 | Refills: 0 | Status: DISCONTINUED | OUTPATIENT
Start: 2017-04-04 | End: 2017-04-05

## 2017-04-04 RX ORDER — LANOLIN ALCOHOL/MO/W.PET/CERES
3 CREAM (GRAM) TOPICAL AT BEDTIME
Qty: 0 | Refills: 0 | Status: DISCONTINUED | OUTPATIENT
Start: 2017-04-04 | End: 2017-04-05

## 2017-04-04 RX ORDER — SODIUM CHLORIDE 9 MG/ML
1000 INJECTION INTRAMUSCULAR; INTRAVENOUS; SUBCUTANEOUS
Qty: 0 | Refills: 0 | Status: DISCONTINUED | OUTPATIENT
Start: 2017-04-04 | End: 2017-04-05

## 2017-04-04 RX ORDER — THIAMINE MONONITRATE (VIT B1) 100 MG
100 TABLET ORAL DAILY
Qty: 0 | Refills: 0 | Status: DISCONTINUED | OUTPATIENT
Start: 2017-04-04 | End: 2017-04-05

## 2017-04-04 RX ADMIN — ONDANSETRON 4 MILLIGRAM(S): 8 TABLET, FILM COATED ORAL at 13:07

## 2017-04-04 RX ADMIN — SODIUM CHLORIDE 1000 MILLILITER(S): 9 INJECTION INTRAMUSCULAR; INTRAVENOUS; SUBCUTANEOUS at 13:07

## 2017-04-04 RX ADMIN — Medication 1 MILLIGRAM(S): at 20:12

## 2017-04-04 RX ADMIN — Medication 100 MILLIGRAM(S): at 20:12

## 2017-04-04 RX ADMIN — Medication 10 MILLIGRAM(S): at 15:10

## 2017-04-04 RX ADMIN — Medication 10 MILLIGRAM(S): at 00:34

## 2017-04-04 RX ADMIN — SODIUM CHLORIDE 1000 MILLILITER(S): 9 INJECTION INTRAMUSCULAR; INTRAVENOUS; SUBCUTANEOUS at 00:34

## 2017-04-04 NOTE — H&P ADULT - ATTENDING COMMENTS
pt seen and examined; reports improved sxs as pt is able to take in some food brought in by her significant other s/p antiemetics; Pt reports decreased PO intake since her facial surgery last month, along w/ stress from divorce proceedings. Pt w/ syncopal episode at court, negative workup at ED at [facility]. Pt reports going back home and feeling dizzy and fainting again. She was brought to [facility] ED this time w/ negative workup. Pt was concerned syncopal episodes that occurred at court and again at home was a symptom of recurring acoustic neuroma as she had similar sxs on presentation in 2014. Pt reports last drink was this past Sunday, last episode of vomiting was at noon today, nonbloody but bilious.  PE  gen: awake, alert, tired appearing, anxious  heent: +L facial paralysis/ L facial swelling as noted above;  EOMI , perrla, slightly dry MM, no JVD; +mild tongue fasciculations  cvs: s1s2   lungs: CTA B/L  abd: soft, no bruising noted anteriorly, nontender, nondistended  ext: no lower ext edema/ tenderness b/l   neuro: +hand tremors b/l, moves all extremities, 5/5 motor strength in all 4 extremities    1. syncopal episode, orthostatic hypotension/ dehydration/ vomiting: vomiting sxs w/ antiemetics; EKG pending; syncopal episodes occuring in setting of dehydration, decreased PO intake ;  c/w IVFs; concern for possible withdrawl sxs, started on ativan prn, monitor CIWA overnight   2. hypokalemia: repleted,  monitor lytes   3. acoustic neuroma: plan as above

## 2017-04-04 NOTE — ED PROVIDER NOTE - ENMT, MLM
Airway patent, Nasal mucosa clear. Mouth with normal mucosa. Throat has no vesicles, no oropharyngeal exudates and uvula is midline.  Facial asymmetry

## 2017-04-04 NOTE — ED PROVIDER NOTE - PROGRESS NOTE DETAILS
Pt received 1L IV NS and fluids given; (+) orthostatic.  2nd liter of IV fluids initiated d/w PMD Dr Gerard; states to admit to hospitalist service  Lipase added per his request; reviewed negative Pt received 1L IV NS and fluids given; (+) orthostatic.  2nd liter of IV fluids initiated  Pt given po fluid change, (+) vomiting;  Reglan 10 mg IV ordered;  pt abd remaisn benign; soft and nontender

## 2017-04-04 NOTE — H&P ADULT - NSHPPHYSICALEXAM_GEN_ALL_CORE
PHYSICAL EXAM:    Constitutional: Anxious appearing with visable mild tremor.  Facial swelling on left face with bruising.     Eyes: PERRL    ENMT: Left face swollen and brused, not tender to palpation and no signs of cellulitis.  MMM    Neck: No lymphadenopathy.     Respiratory:CTAB no wheezes or rhonchi    Cardiovascular: RRR no murmurs appreciated    Gastrointestinal: Soft, non-tender, hyperactive bowel sounds in all 4 quadrants. No rebound, guarding and negative stein's sign.     Genitourinary: No centeno    Extremities: WWP, no edema    Neurological: AAO x 3, answering questions appropriately. Visible resting tremor. 4/5 UE strenth and LE strength.     Lymph Nodes: None palpable    Psychiatric: Constricted affect.

## 2017-04-04 NOTE — ED ADULT TRIAGE NOTE - CHIEF COMPLAINT QUOTE
"I was just discharged this morning at 2AM. I am still nauseous, vomiting (2 times this morning), I feel shaky and still having dizzy spells. My doctor told me to come right back to the ER."

## 2017-04-04 NOTE — ED PROVIDER NOTE - MEDICAL DECISION MAKING DETAILS
47yo F, seen last night and treated for nausea and vomiting; d/c and return with same symptoms;  (+) orthostatic and despite IV antiemetics unable to tolerate po;  requires admission for further workup, hydration

## 2017-04-04 NOTE — H&P ADULT - NSHPSOCIALHISTORY_GEN_ALL_CORE
Marital: Process of divorce  Smoking: Denies  ETOH: 3 days per week 1-5 glasses per encounter  Drugs: Denies

## 2017-04-04 NOTE — ED PROVIDER NOTE - OBJECTIVE STATEMENT
Pt is 47yo with hx acoustic neuroma, s/p facial reconstructive surgery about one month ago; seen here in ER yesterday after falling passing out several times.  States was discharged in the middle of night, feeling improved; since d/c pt feeling faint with continued nausea and reports vomiting;  Pt denies passing out;  Pt had workup yesterday including negative CTA for pulmonary embolism;  pt denies chest pain;

## 2017-04-04 NOTE — H&P ADULT - NSHPREVIEWOFSYSTEMS_GEN_ALL_CORE
REVIEW OF SYSTEMS:    CONSTITUTIONAL: No fever, weight loss, or fatigue  EYES: No eye pain, visual disturbances, or discharge  ENMT:  No difficulty hearing, tinnitus, vertigo; No sinus or throat pain  NECK: No pain or stiffness  BREASTS: No pain, masses, or nipple discharge  RESPIRATORY: No cough, wheezing, chills or hemoptysis; No shortness of breath  CARDIOVASCULAR: No chest pain, palpitations, + dizziness  GASTROINTESTINAL: No abdominal or epigastric pain., hematemesis; No diarrhea   +constipation. +nausea, vomiting  GENITOURINARY: No dysuria, frequency, hematuria, or incontinence  NEUROLOGICAL: +headaches and tremors  No new memory loss, loss of strength, numbness   SKIN: No itching, burning, rashes, or lesions   LYMPH NODES: No enlarged glands  ENDOCRINE: No heat or cold intolerance; No hair loss  MUSCULOSKELETAL: No joint pain or swelling; No muscle, back, or extremity pain  PSYCHIATRIC: No depression, anxiety, mood swings, or difficulty sleeping  HEME/LYMPH: No easy bruising, or bleeding gums  ALLERY AND IMMUNOLOGIC: No hives or eczema

## 2017-04-04 NOTE — H&P ADULT - HISTORY OF PRESENT ILLNESS
46F pmhx of anxiety, acoustic neuroma with  shunt for hydrocephalus 2014 with recent facial reconstruction 3/2017 at [facility].  She was discharged home and was taking a medrol dose pack and ocasional percocet without complication.  2 days ago she was in court with her Ex- and synopsized  She was taken by EMS to [facility] and released after IV hydration. She admited at that time to LOC, SOB and chest tightness but no bowel or bladder incontinence and woke up immediately w/o confusion period.  Yesterday she again was feeling light headed and went to ED and was found to be dehydrated, had some vomiting and was not taking in much PO 2/2 nausea and vomiting.  She was again given IV hydration and CT head which was negative just showing the  shunt w/o hydrocephalus and discharged home.  She went to her PMD today and said she was unable to tolerate PO so was sent again to ED.  She comes in today saying that she threw up everything she has tried to eat for the past 2 days, non-bloody, +bilious vomitus.  She denies any fevers or chills but did have "night sweats" last night.  She is not having any abdominal pain, is passing gas, but no stool in 3 days.   She drinks 3 days/week 1-5 glasses per time w/o hx of w/d.      In ED:   T 97.4 HR 76 /94 + orthostatics and she was given 2L NS, zofran and reglan with some improvement in symptoms. Had 1 episode of vomiting in ED.

## 2017-04-04 NOTE — H&P ADULT - PROBLEM SELECTOR PLAN 1
Viral gastroenteritis vs. Ileius from opiate medication vs. severe anxiety vs. ETOH w/d.  -F/U read Abd xray, low suspicion for obstruction as she does not have subjective or objective abdominal pain and is passing gas but has not had BM in 3 days and is not tolerating any PO.  -Check Lipase  -Pt has hx of ETOH use without w/d, last drink was 2 days ago and she is having headache, anxiety, tremor and n/v which could be s/s of withdrawal if not due to gastroenteritis.  Will give Ativan 2mg IV push now and see result then likely CIWA protocol.  -Check lipase, low suspicion for pancreatitis given benign abdominal exam.  -Continue IV hydration will unable to take PO with NS @80mls/hr s/p 2 L NS and repeat orthostatics.  Will also start clear liquid diet and advance as tolerate.  -Electrolytes stable, will replete K. No NICK.  -Continue zofran 4mg IV PRN nausea, monitor QTc on EKG Viral gastroenteritis vs. Ileius from opiate medication vs. severe anxiety vs. ETOH w/d.  -F/U read Abd xray, low suspicion for obstruction as she does not have subjective or objective abdominal pain and is passing gas but has not had BM in 3 days and is not tolerating any PO.  -Check Lipase  -Pt has hx of ETOH use without w/d, last drink was 2 days ago and she is having headache, anxiety, tremor and n/v which could be s/s of withdrawal if not due to gastroenteritis.  Will give Ativan 2mg IV push now and see result then likely CIWA protocol. Pt also on Thiamine and folate as outpatient suggestive of ETOH history. Will continue here.  -Check lipase, low suspicion for pancreatitis given benign abdominal exam.  -Continue IV hydration will unable to take PO with NS @80mls/hr s/p 2 L NS and repeat orthostatics.  Will also start clear liquid diet and advance as tolerate.  -Electrolytes stable, will replete K. No NICK.  -Continue zofran 4mg IV PRN nausea, monitor QTc on EKG

## 2017-04-04 NOTE — H&P ADULT - NSHPLABSRESULTS_GEN_ALL_CORE
12.0   4.6   )-----------( 195      ( 2017 12:26 )             35.5     2017 12:26    135    |  100    |  3      ----------------------------<  126    3.3     |  22     |  0.52     Ca    8.3        2017 12:26    TPro  7.2    /  Alb  3.9    /  TBili  0.9    /  DBili  x      /  AST  47     /  ALT  44     /  AlkPhos  50     2017 12:26    LIVER FUNCTIONS - ( 2017 12:26 )  Alb: 3.9 g/dL / Pro: 7.2 g/dL / ALK PHOS: 50 U/L / ALT: 44 U/L / AST: 47 U/L / GGT: x             CAPILLARY BLOOD GLUCOSE  93 (2017 20:13)    CARDIAC MARKERS ( 2017 12:26 )  <0.015 ng/mL / x     / x     / x     / x          Urinalysis Basic - ( 2017 22:52 )    Color: Yellow / Appearance: Clear / S.010 / pH: x  Gluc: x / Ketone: NEGATIVE  / Bili: NEGATIVE / Urobili: 0.2 E.U./dL   Blood: x / Protein: NEGATIVE mg/dL / Nitrite: NEGATIVE   Leuk Esterase: NEGATIVE / RBC: < 5 /HPF / WBC < 5 /HPF   Sq Epi: x / Non Sq Epi: Few /HPF / Bacteria: Present /HPF

## 2017-04-04 NOTE — H&P ADULT - PROBLEM SELECTOR PLAN 2
Pt has  shunt which is stable w/o hydrocephalus on CT head done yesterday and had recent facial reconstruction done by Dr. Lozada. Will inform him she is here as she had an appointment with him this week.  -Continue Oxycodne home dose PRN for pain control, face is still swollen and healing.

## 2017-04-04 NOTE — ED ADULT NURSE NOTE - OBJECTIVE STATEMENT
received pt in room 4. A&Ox3, presents to ed for c.o abd discomfort with nausea. pt seen within the ed, discharged at 2am, unable to tolerate po intake with 2 episodes of vomiting. pt c.o intermittent dizziness. denies cp, no sob. no fever or chills. iv placed, labs drawn. iv ns infusing. vss. awaiting md zuñiga.

## 2017-04-05 ENCOUNTER — TRANSCRIPTION ENCOUNTER (OUTPATIENT)
Age: 47
End: 2017-04-05

## 2017-04-05 VITALS
HEART RATE: 87 BPM | OXYGEN SATURATION: 99 % | TEMPERATURE: 97 F | SYSTOLIC BLOOD PRESSURE: 139 MMHG | RESPIRATION RATE: 16 BRPM | DIASTOLIC BLOOD PRESSURE: 87 MMHG

## 2017-04-05 LAB
ALBUMIN SERPL ELPH-MCNC: 3.7 G/DL — SIGNIFICANT CHANGE UP (ref 3.4–5)
ALP SERPL-CCNC: 45 U/L — SIGNIFICANT CHANGE UP (ref 40–120)
ALT FLD-CCNC: 43 U/L — HIGH (ref 12–42)
AMPHET UR-MCNC: SIGNIFICANT CHANGE UP NG/ML
ANION GAP SERPL CALC-SCNC: 10 MMOL/L — SIGNIFICANT CHANGE UP (ref 9–16)
AST SERPL-CCNC: 52 U/L — HIGH (ref 15–37)
BARBITURATES UR SCN-MCNC: NEGATIVE — SIGNIFICANT CHANGE UP
BENZODIAZ UR-MCNC: SIGNIFICANT CHANGE UP
BILIRUB SERPL-MCNC: 0.6 MG/DL — SIGNIFICANT CHANGE UP (ref 0.2–1.2)
BUN SERPL-MCNC: 3 MG/DL — LOW (ref 7–23)
CALCIUM SERPL-MCNC: 8.6 MG/DL — SIGNIFICANT CHANGE UP (ref 8.5–10.5)
CHLORIDE SERPL-SCNC: 102 MMOL/L — SIGNIFICANT CHANGE UP (ref 96–108)
CO2 SERPL-SCNC: 24 MMOL/L — SIGNIFICANT CHANGE UP (ref 22–31)
COCAINE METAB.OTHER UR-MCNC: SIGNIFICANT CHANGE UP NG/ML
CREAT SERPL-MCNC: 0.61 MG/DL — SIGNIFICANT CHANGE UP (ref 0.5–1.3)
GLUCOSE SERPL-MCNC: 89 MG/DL — SIGNIFICANT CHANGE UP (ref 70–99)
HCT VFR BLD CALC: 35.4 % — SIGNIFICANT CHANGE UP (ref 34.5–45)
HGB BLD-MCNC: 11.8 G/DL — SIGNIFICANT CHANGE UP (ref 11.5–15.5)
MAGNESIUM SERPL-MCNC: 1.8 MG/DL — SIGNIFICANT CHANGE UP (ref 1.6–2.4)
MCHC RBC-ENTMCNC: 33.3 G/DL — SIGNIFICANT CHANGE UP (ref 32–36)
MCHC RBC-ENTMCNC: 33.6 PG — SIGNIFICANT CHANGE UP (ref 27–34)
MCV RBC AUTO: 100.9 FL — HIGH (ref 80–100)
METHADONE UR-MCNC: SIGNIFICANT CHANGE UP NG/ML
OPIATES UR-MCNC: SIGNIFICANT CHANGE UP NG/ML
PCP SPEC-MCNC: SIGNIFICANT CHANGE UP
PCP UR-MCNC: SIGNIFICANT CHANGE UP NG/ML
PHOSPHATE SERPL-MCNC: 3.2 MG/DL — SIGNIFICANT CHANGE UP (ref 2.5–4.5)
PLATELET # BLD AUTO: 189 K/UL — SIGNIFICANT CHANGE UP (ref 150–400)
POTASSIUM SERPL-MCNC: 4.1 MMOL/L — SIGNIFICANT CHANGE UP (ref 3.5–5.3)
POTASSIUM SERPL-SCNC: 4.1 MMOL/L — SIGNIFICANT CHANGE UP (ref 3.5–5.3)
PROT SERPL-MCNC: 6.8 G/DL — SIGNIFICANT CHANGE UP (ref 6.4–8.2)
RBC # BLD: 3.51 M/UL — LOW (ref 3.8–5.2)
RBC # FLD: 12.1 % — SIGNIFICANT CHANGE UP (ref 10.3–16.9)
SODIUM SERPL-SCNC: 136 MMOL/L — SIGNIFICANT CHANGE UP (ref 135–145)
THC UR QL: SIGNIFICANT CHANGE UP NG/ML
WBC # BLD: 3.2 K/UL — LOW (ref 3.8–10.5)
WBC # FLD AUTO: 3.2 K/UL — LOW (ref 3.8–10.5)

## 2017-04-05 PROCEDURE — G0378: CPT

## 2017-04-05 PROCEDURE — 96375 TX/PRO/DX INJ NEW DRUG ADDON: CPT

## 2017-04-05 PROCEDURE — 74020: CPT

## 2017-04-05 PROCEDURE — 80053 COMPREHEN METABOLIC PANEL: CPT

## 2017-04-05 PROCEDURE — 84100 ASSAY OF PHOSPHORUS: CPT

## 2017-04-05 PROCEDURE — 84484 ASSAY OF TROPONIN QUANT: CPT

## 2017-04-05 PROCEDURE — 93005 ELECTROCARDIOGRAM TRACING: CPT

## 2017-04-05 PROCEDURE — 83735 ASSAY OF MAGNESIUM: CPT

## 2017-04-05 PROCEDURE — 93010 ELECTROCARDIOGRAM REPORT: CPT

## 2017-04-05 PROCEDURE — 96374 THER/PROPH/DIAG INJ IV PUSH: CPT

## 2017-04-05 PROCEDURE — 99285 EMERGENCY DEPT VISIT HI MDM: CPT | Mod: 25

## 2017-04-05 PROCEDURE — 36415 COLL VENOUS BLD VENIPUNCTURE: CPT

## 2017-04-05 PROCEDURE — 85027 COMPLETE CBC AUTOMATED: CPT

## 2017-04-05 PROCEDURE — 85025 COMPLETE CBC W/AUTO DIFF WBC: CPT

## 2017-04-05 PROCEDURE — 99238 HOSP IP/OBS DSCHRG MGMT 30/<: CPT

## 2017-04-05 PROCEDURE — 83690 ASSAY OF LIPASE: CPT

## 2017-04-05 PROCEDURE — 84702 CHORIONIC GONADOTROPIN TEST: CPT

## 2017-04-05 PROCEDURE — 83880 ASSAY OF NATRIURETIC PEPTIDE: CPT

## 2017-04-05 PROCEDURE — 80307 DRUG TEST PRSMV CHEM ANLYZR: CPT

## 2017-04-05 RX ORDER — POTASSIUM CHLORIDE 20 MEQ
40 PACKET (EA) ORAL
Qty: 0 | Refills: 0 | Status: COMPLETED | OUTPATIENT
Start: 2017-04-05 | End: 2017-04-05

## 2017-04-05 RX ADMIN — Medication 40 MILLIEQUIVALENT(S): at 03:05

## 2017-04-05 RX ADMIN — Medication 40 MILLIEQUIVALENT(S): at 01:56

## 2017-04-05 RX ADMIN — Medication 3 MILLIGRAM(S): at 00:54

## 2017-04-05 RX ADMIN — Medication 1 MILLIGRAM(S): at 11:30

## 2017-04-05 RX ADMIN — SODIUM CHLORIDE 80 MILLILITER(S): 9 INJECTION INTRAMUSCULAR; INTRAVENOUS; SUBCUTANEOUS at 00:00

## 2017-04-05 RX ADMIN — Medication 100 MILLIGRAM(S): at 11:30

## 2017-04-05 NOTE — DISCHARGE NOTE ADULT - MEDICATION SUMMARY - MEDICATIONS TO STOP TAKING
I will STOP taking the medications listed below when I get home from the hospital:    cephalexin 500 mg oral capsule  -- 1 cap(s) by mouth 4 times a day x 7 days  -- Finish all this medication unless otherwise directed by prescriber.    Medrol Dosepak 4 mg oral tablet  -- Take medrol dose pack as instructed on package.  -- It is very important that you take or use this exactly as directed.  Do not skip doses or discontinue unless directed by your doctor.  Obtain medical advice before taking any non-prescription drugs as some may affect the action of this medication.  Take with food or milk.    senna 8.6 mg oral tablet  -- 2 tab(s) by mouth once a day (at bedtime), As Needed -for constipation    Colace sodium 100 mg oral capsule  -- 1 cap(s) by mouth 2 times a day, As Needed -for constipation  -- Medication should be taken with plenty of water.    oxyCODONE 5 mg oral tablet  -- 1 tab(s) by mouth every 4-6 hours MDD:5-6 tabs PRN severe pain  -- Caution federal law prohibits the transfer of this drug to any person other  than the person for whom it was prescribed.  It is very important that you take or use this exactly as directed.  Do not skip doses or discontinue unless directed by your doctor.  May cause drowsiness.  Alcohol may intensify this effect.  Use care when operating dangerous machinery.  This prescription cannot be refilled.  Using more of this medication than prescribed may cause serious breathing problems.

## 2017-04-05 NOTE — DISCHARGE NOTE ADULT - ADDITIONAL INSTRUCTIONS
Please schedule a post-discharge follow up appointment with Dr. Gerard within 1-2 weeks of discharge. Please follow up with Dr. Bowser for a follow-up appointment as well.

## 2017-04-05 NOTE — DISCHARGE NOTE ADULT - PLAN OF CARE
Improvement in vomiting You were admitted with nausea and vomiting. This could have been caused by viral gastroenteritis Please follow up with your PMD for continued monitoring of this condition. You were admitted with nausea and vomiting which has since improved. Please follow up with your PMD to monitor your recovery.

## 2017-04-05 NOTE — DISCHARGE NOTE ADULT - HOSPITAL COURSE
46F pmhx of anxiety, acoustic neuroma with  shunt for hydrocephalus 2014 with recent facial reconstruction 3/2017 at [facility] who presented to ED on 4/4 w/ complaint of intractable nausea and vomiting. Patient admitted and given IV hydration with zofran 4mg IV PRN nausea. Patient's status improved o/n; last episode of vomiting on 4/4. Patient tolerated PO intake and stable for discharge on 4/5.

## 2017-04-05 NOTE — DISCHARGE NOTE ADULT - PATIENT PORTAL LINK FT
“You can access the Follow[facility] Patient Portal, offered by [facility], by registering with the following website: [email or web address]”

## 2017-04-05 NOTE — DISCHARGE NOTE ADULT - MEDICATION SUMMARY - MEDICATIONS TO TAKE
I will START or STAY ON the medications listed below when I get home from the hospital:    acetaminophen 325 mg oral tablet  -- 2 tab(s) by mouth every 6 hours, As needed, Mild Pain, fever>100.4, headache  -- Indication: For Headache    Aquaphor topical ointment  -- Apply on skin to lips 2 times a day PRN dry lips  -- For external use only.    -- Indication: For Dry Lips    benzocaine-menthol 15 mg-3.6 mg mucous membrane lozenge  -- 1 lozenge mucous membrane 4 times a day, As Needed for sore throat  -- Indication: For Sore throat    folic acid 1 mg oral tablet  -- 1 tab(s) by mouth once a day  -- Indication: For Alcohol use    thiamine 100 mg oral tablet  -- 1 tab(s) by mouth once a day  -- Indication: For Alcohol use

## 2017-04-05 NOTE — DISCHARGE NOTE ADULT - CARE PLAN
Principal Discharge DX:	Intractable vomiting with nausea, unspecified vomiting type  Goal:	Improvement in vomiting  Instructions for follow-up, activity and diet:	You were admitted with nausea and vomiting. This could have been caused by viral gastroenteritis  Secondary Diagnosis:	Acoustic neuroma Principal Discharge DX:	Intractable vomiting with nausea, unspecified vomiting type  Goal:	Improvement in vomiting  Instructions for follow-up, activity and diet:	You were admitted with nausea and vomiting which has since improved. Please follow up with your PMD to monitor your recovery.  Secondary Diagnosis:	Acoustic neuroma  Instructions for follow-up, activity and diet:	Please follow up with your PMD for continued monitoring of this condition.

## 2017-04-05 NOTE — DISCHARGE NOTE ADULT - CARE PROVIDERS DIRECT ADDRESSES
,DirectAddress_Unknown,DirectAddress_Unknown ,DirectAddress_Unknown,[email or web address],DirectAddress_Unknown

## 2017-04-05 NOTE — DISCHARGE NOTE ADULT - CARE PROVIDER_API CALL
Greg Gerard (MD), Internal Medicine  [address] [address]  Phone: [phone]  Fax: [phone] Greg Gerard), Internal Medicine  [address] [address]  Phone: [phone]  Fax: [phone]    Edward Bowser), Otolaryngology  [address] [place] [address]  Phone: [phone]  Fax: [phone]

## 2017-04-07 DIAGNOSIS — I95.1 ORTHOSTATIC HYPOTENSION: ICD-10-CM

## 2017-04-07 DIAGNOSIS — E87.6 HYPOKALEMIA: ICD-10-CM

## 2017-04-07 DIAGNOSIS — E86.0 DEHYDRATION: ICD-10-CM

## 2017-04-07 DIAGNOSIS — R11.2 NAUSEA WITH VOMITING, UNSPECIFIED: ICD-10-CM

## 2017-04-07 DIAGNOSIS — D33.3 BENIGN NEOPLASM OF CRANIAL NERVES: ICD-10-CM

## 2017-04-07 DIAGNOSIS — Z79.891 LONG TERM (CURRENT) USE OF OPIATE ANALGESIC: ICD-10-CM

## 2017-04-08 DIAGNOSIS — Z98.2 PRESENCE OF CEREBROSPINAL FLUID DRAINAGE DEVICE: ICD-10-CM

## 2017-04-24 ENCOUNTER — APPOINTMENT (OUTPATIENT)
Dept: OTOLARYNGOLOGY | Facility: CLINIC | Age: 47
End: 2017-04-24

## 2017-04-26 ENCOUNTER — TRANSCRIPTION ENCOUNTER (OUTPATIENT)
Age: 47
End: 2017-04-26

## 2017-05-01 ENCOUNTER — APPOINTMENT (OUTPATIENT)
Dept: OTOLARYNGOLOGY | Facility: CLINIC | Age: 47
End: 2017-05-01

## 2017-05-01 VITALS
BODY MASS INDEX: 19.99 KG/M2 | DIASTOLIC BLOOD PRESSURE: 94 MMHG | SYSTOLIC BLOOD PRESSURE: 165 MMHG | HEIGHT: 69 IN | WEIGHT: 135 LBS | TEMPERATURE: 98.6 F | HEART RATE: 68 BPM

## 2017-06-06 ENCOUNTER — APPOINTMENT (OUTPATIENT)
Dept: OTOLARYNGOLOGY | Facility: CLINIC | Age: 47
End: 2017-06-06

## 2017-07-17 ENCOUNTER — APPOINTMENT (OUTPATIENT)
Dept: OTOLARYNGOLOGY | Facility: CLINIC | Age: 47
End: 2017-07-17

## 2017-07-17 VITALS — DIASTOLIC BLOOD PRESSURE: 86 MMHG | SYSTOLIC BLOOD PRESSURE: 120 MMHG | OXYGEN SATURATION: 95 % | HEART RATE: 79 BPM

## 2017-08-02 ENCOUNTER — EMERGENCY (EMERGENCY)
Facility: HOSPITAL | Age: 47
LOS: 1 days | Discharge: HOME | End: 2017-08-02
Attending: EMERGENCY MEDICINE | Admitting: EMERGENCY MEDICINE
Payer: COMMERCIAL

## 2017-08-02 ENCOUNTER — TRANSCRIPTION ENCOUNTER (OUTPATIENT)
Age: 47
End: 2017-08-02

## 2017-08-02 VITALS
TEMPERATURE: 98 F | DIASTOLIC BLOOD PRESSURE: 88 MMHG | HEART RATE: 77 BPM | RESPIRATION RATE: 16 BRPM | SYSTOLIC BLOOD PRESSURE: 131 MMHG | OXYGEN SATURATION: 99 %

## 2017-08-02 DIAGNOSIS — Z98.890 OTHER SPECIFIED POSTPROCEDURAL STATES: Chronic | ICD-10-CM

## 2017-08-02 DIAGNOSIS — Z79.899 OTHER LONG TERM (CURRENT) DRUG THERAPY: ICD-10-CM

## 2017-08-02 DIAGNOSIS — S46.311A STRAIN OF MUSCLE, FASCIA AND TENDON OF TRICEPS, RIGHT ARM, INITIAL ENCOUNTER: ICD-10-CM

## 2017-08-02 DIAGNOSIS — M79.621 PAIN IN RIGHT UPPER ARM: ICD-10-CM

## 2017-08-02 DIAGNOSIS — S40.021A CONTUSION OF RIGHT UPPER ARM, INITIAL ENCOUNTER: ICD-10-CM

## 2017-08-02 PROCEDURE — 99284 EMERGENCY DEPT VISIT MOD MDM: CPT

## 2017-08-02 PROCEDURE — 76882 US LMTD JT/FCL EVL NVASC XTR: CPT | Mod: 26,RT

## 2017-08-02 RX ORDER — IBUPROFEN 200 MG
600 TABLET ORAL ONCE
Qty: 0 | Refills: 0 | Status: COMPLETED | OUTPATIENT
Start: 2017-08-02 | End: 2017-08-02

## 2017-08-02 RX ORDER — ACETAMINOPHEN 500 MG
975 TABLET ORAL ONCE
Qty: 0 | Refills: 0 | Status: COMPLETED | OUTPATIENT
Start: 2017-08-02 | End: 2017-08-02

## 2017-08-02 RX ADMIN — Medication 600 MILLIGRAM(S): at 15:24

## 2017-08-02 RX ADMIN — Medication 975 MILLIGRAM(S): at 15:24

## 2017-08-02 NOTE — ED PROVIDER NOTE - MEDICAL DECISION MAKING DETAILS
Induration and swelling concerning for triceps injury in RUE. No redness or streaking with what appears to be dependent edema tracking into forearm. Will give PO pain meds and check non-vascular US of arm to evaluate the triceps.

## 2017-08-02 NOTE — ED PROVIDER NOTE - MUSCULOSKELETAL, MLM
Spine appears normal, range of motion is not limited. Induration and swelling concerning for triceps injury in RUE. No redness or streaking with what appears to be dependent edema tracking into forearm. Spine appears normal, range of motion is not limited.

## 2017-08-02 NOTE — ED PROVIDER NOTE - OBJECTIVE STATEMENT
65y F with h/o psoriasis, Hydrocephalus, acoustic neuroma, presents with upper right arm pain and forearm swelling. Pt was suspicious of an allergic reaction so she took benadryl last night without relief. Seen at urgent care today and sent in to r/o DVT. Noted swelling to the distal right arm. Denies bruising, cp, sob, redness. Takes Advil regularly. No recent trauma.

## 2017-08-15 ENCOUNTER — OUTPATIENT (OUTPATIENT)
Dept: OUTPATIENT SERVICES | Facility: HOSPITAL | Age: 47
LOS: 1 days | End: 2017-08-15
Payer: COMMERCIAL

## 2017-08-15 DIAGNOSIS — Z98.890 OTHER SPECIFIED POSTPROCEDURAL STATES: Chronic | ICD-10-CM

## 2017-08-15 PROCEDURE — 73222 MRI JOINT UPR EXTREM W/DYE: CPT | Mod: 26,RT

## 2017-08-15 PROCEDURE — 73222 MRI JOINT UPR EXTREM W/DYE: CPT

## 2017-08-15 PROCEDURE — A9585: CPT

## 2017-08-23 ENCOUNTER — APPOINTMENT (OUTPATIENT)
Dept: ORTHOPEDIC SURGERY | Facility: CLINIC | Age: 47
End: 2017-08-23

## 2017-09-09 ENCOUNTER — OUTPATIENT (OUTPATIENT)
Dept: OUTPATIENT SERVICES | Facility: HOSPITAL | Age: 47
LOS: 1 days | End: 2017-09-09
Payer: COMMERCIAL

## 2017-09-09 DIAGNOSIS — Z98.890 OTHER SPECIFIED POSTPROCEDURAL STATES: Chronic | ICD-10-CM

## 2017-09-09 PROCEDURE — 73060 X-RAY EXAM OF HUMERUS: CPT

## 2017-09-09 PROCEDURE — 73060 X-RAY EXAM OF HUMERUS: CPT | Mod: 26,RT

## 2017-09-15 ENCOUNTER — APPOINTMENT (OUTPATIENT)
Dept: OTOLARYNGOLOGY | Facility: CLINIC | Age: 47
End: 2017-09-15

## 2017-10-06 ENCOUNTER — APPOINTMENT (OUTPATIENT)
Dept: OTOLARYNGOLOGY | Facility: CLINIC | Age: 47
End: 2017-10-06

## 2017-11-01 ENCOUNTER — OTHER (OUTPATIENT)
Age: 47
End: 2017-11-01

## 2017-11-01 ENCOUNTER — RX RENEWAL (OUTPATIENT)
Age: 47
End: 2017-11-01

## 2017-11-01 ENCOUNTER — APPOINTMENT (OUTPATIENT)
Dept: OTOLARYNGOLOGY | Facility: CLINIC | Age: 47
End: 2017-11-01
Payer: COMMERCIAL

## 2017-11-01 VITALS
TEMPERATURE: 98.7 F | SYSTOLIC BLOOD PRESSURE: 110 MMHG | OXYGEN SATURATION: 98 % | HEART RATE: 81 BPM | DIASTOLIC BLOOD PRESSURE: 75 MMHG | RESPIRATION RATE: 18 BRPM

## 2017-11-01 DIAGNOSIS — L40.9 PSORIASIS, UNSPECIFIED: ICD-10-CM

## 2017-11-01 DIAGNOSIS — L91.0 HYPERTROPHIC SCAR: ICD-10-CM

## 2017-11-01 PROCEDURE — 15275 SKIN SUB GRAFT FACE/NK/HF/G: CPT

## 2017-11-01 RX ORDER — TRIAMCINOLONE ACETONIDE 1 MG/G
0.1 CREAM TOPICAL TWICE DAILY
Qty: 1 | Refills: 0 | Status: ACTIVE | COMMUNITY
Start: 2017-11-01 | End: 1900-01-01

## 2017-11-01 RX ORDER — FLUOCINONIDE 0.5 MG/G
0.05 GEL TOPICAL TWICE DAILY
Qty: 1 | Refills: 0 | Status: ACTIVE | COMMUNITY
Start: 2017-11-01 | End: 1900-01-01

## 2017-11-01 RX ORDER — OXYCODONE AND ACETAMINOPHEN 5; 325 MG/1; MG/1
5-325 TABLET ORAL
Qty: 5 | Refills: 0 | Status: ACTIVE | COMMUNITY
Start: 2017-11-01 | End: 1900-01-01

## 2017-11-06 ENCOUNTER — APPOINTMENT (OUTPATIENT)
Dept: ORTHOPEDIC SURGERY | Facility: CLINIC | Age: 47
End: 2017-11-06

## 2017-11-13 ENCOUNTER — LABORATORY RESULT (OUTPATIENT)
Age: 47
End: 2017-11-13

## 2017-11-13 ENCOUNTER — APPOINTMENT (OUTPATIENT)
Dept: PULMONOLOGY | Facility: CLINIC | Age: 47
End: 2017-11-13
Payer: COMMERCIAL

## 2017-11-13 PROCEDURE — 99396 PREV VISIT EST AGE 40-64: CPT | Mod: 25

## 2017-11-13 PROCEDURE — 36415 COLL VENOUS BLD VENIPUNCTURE: CPT

## 2017-11-17 ENCOUNTER — APPOINTMENT (OUTPATIENT)
Dept: ORTHOPEDIC SURGERY | Facility: CLINIC | Age: 47
End: 2017-11-17

## 2017-11-20 ENCOUNTER — APPOINTMENT (OUTPATIENT)
Dept: OTOLARYNGOLOGY | Facility: CLINIC | Age: 47
End: 2017-11-20

## 2017-12-04 ENCOUNTER — APPOINTMENT (OUTPATIENT)
Dept: ORTHOPEDIC SURGERY | Facility: CLINIC | Age: 47
End: 2017-12-04
Payer: COMMERCIAL

## 2017-12-04 ENCOUNTER — OUTPATIENT (OUTPATIENT)
Dept: OUTPATIENT SERVICES | Facility: HOSPITAL | Age: 47
LOS: 1 days | End: 2017-12-04
Payer: COMMERCIAL

## 2017-12-04 DIAGNOSIS — Z98.890 OTHER SPECIFIED POSTPROCEDURAL STATES: Chronic | ICD-10-CM

## 2017-12-04 DIAGNOSIS — M79.9 SOFT TISSUE DISORDER, UNSPECIFIED: ICD-10-CM

## 2017-12-04 PROCEDURE — 76700 US EXAM ABDOM COMPLETE: CPT

## 2017-12-04 PROCEDURE — 76700 US EXAM ABDOM COMPLETE: CPT | Mod: 26

## 2017-12-04 PROCEDURE — 99203 OFFICE O/P NEW LOW 30 MIN: CPT

## 2017-12-18 ENCOUNTER — APPOINTMENT (OUTPATIENT)
Dept: OTOLARYNGOLOGY | Facility: CLINIC | Age: 47
End: 2017-12-18
Payer: COMMERCIAL

## 2017-12-18 VITALS
WEIGHT: 135 LBS | SYSTOLIC BLOOD PRESSURE: 127 MMHG | HEART RATE: 109 BPM | TEMPERATURE: 98.3 F | DIASTOLIC BLOOD PRESSURE: 84 MMHG | HEIGHT: 69 IN | BODY MASS INDEX: 19.99 KG/M2

## 2017-12-18 DIAGNOSIS — D33.3 BENIGN NEOPLASM OF CRANIAL NERVES: ICD-10-CM

## 2017-12-18 DIAGNOSIS — M95.2 OTHER ACQUIRED DEFORMITY OF HEAD: ICD-10-CM

## 2017-12-18 PROCEDURE — 99213 OFFICE O/P EST LOW 20 MIN: CPT

## 2017-12-19 PROBLEM — M95.2 FACIAL ASYMMETRY, ACQUIRED: Status: ACTIVE | Noted: 2017-01-09

## 2017-12-19 RX ORDER — TRAZODONE HYDROCHLORIDE 50 MG/1
50 TABLET ORAL
Qty: 30 | Refills: 0 | Status: ACTIVE | COMMUNITY
Start: 2017-08-07

## 2017-12-19 RX ORDER — FLUOCINOLONE ACETONIDE 0.11 MG/ML
0.01 OIL TOPICAL
Qty: 118 | Refills: 0 | Status: ACTIVE | COMMUNITY
Start: 2017-09-29

## 2017-12-19 RX ORDER — LEVOFLOXACIN 500 MG/1
500 TABLET, FILM COATED ORAL DAILY
Qty: 2 | Refills: 0 | Status: DISCONTINUED | COMMUNITY
Start: 2017-11-01 | End: 2017-12-19

## 2017-12-19 RX ORDER — FLUOCINONIDE 1 MG/G
0.1 CREAM TOPICAL
Qty: 30 | Refills: 0 | Status: ACTIVE | COMMUNITY
Start: 2017-11-21

## 2017-12-19 RX ORDER — CALCIPOTRIENE AND BETAMETHASONE DIPROPIONATE 50; .5 UG/G; MG/G
0.005-0.064 SUSPENSION TOPICAL
Qty: 60 | Refills: 0 | Status: ACTIVE | COMMUNITY
Start: 2017-11-21

## 2018-02-12 ENCOUNTER — FORM ENCOUNTER (OUTPATIENT)
Age: 48
End: 2018-02-12

## 2018-02-13 ENCOUNTER — APPOINTMENT (OUTPATIENT)
Dept: MRI IMAGING | Facility: HOSPITAL | Age: 48
End: 2018-02-13

## 2018-02-13 ENCOUNTER — OUTPATIENT (OUTPATIENT)
Dept: OUTPATIENT SERVICES | Facility: HOSPITAL | Age: 48
LOS: 1 days | End: 2018-02-13
Payer: COMMERCIAL

## 2018-02-13 DIAGNOSIS — Z98.890 OTHER SPECIFIED POSTPROCEDURAL STATES: Chronic | ICD-10-CM

## 2018-02-13 PROCEDURE — 70553 MRI BRAIN STEM W/O & W/DYE: CPT | Mod: 26

## 2018-02-13 PROCEDURE — A9585: CPT

## 2018-02-13 PROCEDURE — 70553 MRI BRAIN STEM W/O & W/DYE: CPT

## 2018-04-16 ENCOUNTER — APPOINTMENT (OUTPATIENT)
Dept: OTOLARYNGOLOGY | Facility: CLINIC | Age: 48
End: 2018-04-16
Payer: COMMERCIAL

## 2018-04-16 VITALS
DIASTOLIC BLOOD PRESSURE: 73 MMHG | HEART RATE: 80 BPM | TEMPERATURE: 98.6 F | HEIGHT: 69 IN | SYSTOLIC BLOOD PRESSURE: 113 MMHG | OXYGEN SATURATION: 100 %

## 2018-04-16 DIAGNOSIS — J34.89 OTHER SPECIFIED DISORDERS OF NOSE AND NASAL SINUSES: ICD-10-CM

## 2018-04-16 DIAGNOSIS — H04.122 DRY EYE SYNDROME OF LEFT LACRIMAL GLAND: ICD-10-CM

## 2018-04-16 DIAGNOSIS — G51.0 BELL'S PALSY: ICD-10-CM

## 2018-04-16 PROCEDURE — 99213 OFFICE O/P EST LOW 20 MIN: CPT

## 2018-04-18 PROBLEM — J34.89 NASAL OBSTRUCTION: Status: ACTIVE | Noted: 2017-02-07

## 2018-04-18 PROBLEM — H04.122 DRY EYE OF LEFT SIDE: Status: ACTIVE | Noted: 2017-01-09

## 2018-05-15 ENCOUNTER — APPOINTMENT (OUTPATIENT)
Dept: NEUROSURGERY | Facility: CLINIC | Age: 48
End: 2018-05-15

## 2020-02-19 NOTE — ED ADULT NURSE NOTE - NS ED NOTE ABUSE SUSPICION NEGLECT YN
You will be on Xeljanz at 0ne pill day = 11 mg, will do a preauthorization.  (prior rash with  humira over whole leg with each injection.)    When you start Xeljanz stop the hydroxychloroquine and the Sulfasalazine    
no
